# Patient Record
Sex: MALE | Race: BLACK OR AFRICAN AMERICAN | URBAN - METROPOLITAN AREA
[De-identification: names, ages, dates, MRNs, and addresses within clinical notes are randomized per-mention and may not be internally consistent; named-entity substitution may affect disease eponyms.]

---

## 2017-10-17 ENCOUNTER — OFFICE VISIT (OUTPATIENT)
Dept: URGENT CARE | Facility: URGENT CARE | Age: 52
End: 2017-10-17

## 2017-10-17 VITALS
OXYGEN SATURATION: 96 % | WEIGHT: 227 LBS | SYSTOLIC BLOOD PRESSURE: 134 MMHG | DIASTOLIC BLOOD PRESSURE: 83 MMHG | HEART RATE: 73 BPM | TEMPERATURE: 98.3 F

## 2017-10-17 DIAGNOSIS — S86.912A KNEE STRAIN, LEFT, INITIAL ENCOUNTER: Primary | ICD-10-CM

## 2017-10-17 PROCEDURE — 99213 OFFICE O/P EST LOW 20 MIN: CPT | Performed by: FAMILY MEDICINE

## 2017-10-17 ASSESSMENT — PAIN SCALES - GENERAL: PAINLEVEL: SEVERE PAIN (6)

## 2017-10-17 NOTE — MR AVS SNAPSHOT
"              After Visit Summary   10/17/2017    Nadir Marrero    MRN: [id number]           Patient Information     Date Of Birth          1965        Visit Information        Provider Department      10/17/2017 7:15 PM Juan Tristan MD [facility]        Today's Diagnoses     Knee strain, left, initial encounter    -  1       Follow-ups after your visit        Who to contact     If you have questions or need follow up information about today's clinic visit or your schedule please contact [facility] directly at [phone].  Normal or non-critical lab and imaging results will be communicated to you by [employer], letter or phone within 4 business days after the clinic has received the results. If you do not hear from us within 7 days, please contact the clinic through [employer] or phone. If you have a critical or abnormal lab result, we will notify you by phone as soon as possible.  Submit refill requests through [employer] or call your pharmacy and they will forward the refill request to us. Please allow 3 business days for your refill to be completed.          Additional Information About Your Visit        MyChart Information     [employer] lets you send messages to your doctor, view your test results, renew your prescriptions, schedule appointments and more. To sign up, go to [email or web address] . Click on \"Log in\" on the left side of the screen, which will take you to the Welcome page. Then click on \"Sign up Now\" on the right side of the page.     You will be asked to enter the access code listed below, as well as some personal information. Please follow the directions to create your username and password.     Your access code is: [email or web address]  Expires: 1/15/2018  8:04 PM     Your access code will  in 90 days. If you need help or a new code, please call your [facility] or [phone].        Care EveryWhere ID     This is your Care EveryWhere ID. " This could be used by other organizations to access your [facility] medical records  [id number]        Your Vitals Were     Pulse Temperature Pulse Oximetry             73 98.3  F (36.8  C) (Oral) 96%          Blood Pressure from Last 3 Encounters:   10/17/17 134/83    Weight from Last 3 Encounters:   10/17/17 227 lb (103 kg)              Today, you had the following     No orders found for display       Primary Care Provider    None Specified       No primary provider on file.        Equal Access to Services     [facility]: Hadii aad ku hadasho Soomaali, waaxda luqadaha, qaybta kaalmada adeegyada, waxay idiin hayaan adeeg jayashree beavers . So [facility] [phone].    ATENCIÓN: Si habla español, tiene a camacho disposición servicios gratuitos de asistencia lingüística. Llame al [phone].    We comply with applicable federal civil rights laws and [place] laws. We do not discriminate on the basis of race, color, national origin, age, disability, sex, sexual orientation, or gender identity.            Thank you!     Thank you for choosing [facility]  for your care. Our goal is always to provide you with excellent care. Hearing back from our patients is one way we can continue to improve our services. Please take a few minutes to complete the written survey that you may receive in the mail after your visit with us. Thank you!             Your Updated Medication List - Protect others around you: Learn how to safely use, store and throw away your medicines at www.disposemymeds.org.      Notice  As of 10/17/2017  8:04 PM    You have not been prescribed any medications.

## 2017-10-17 NOTE — LETTER
"[facility]  [address]  [facility] [address]  Phone: [phone]    October 17, 2017        Nadir Marrero  [address] [place] [id number]          To whom it may concern:    RE: Nadir Marrero    Patient was seen and treated today at our clinic. 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     He should avoid climbing and risk of falls over the next 1-2 weeks until his pain has improved. I would therefore recommend that he avoid painful driving or bending of the knee over the next week. He may continue to weight bear as tolerated. Follow-up if symptoms persist or worsen.        Sincerely,        Juan Tristan MD  "

## 2017-10-18 NOTE — PROGRESS NOTES
Some of this note was populated by a medical assistant.     SUBJECTIVE:   Nadir Marrero is a 51 year old male who presents to clinic today for the following health issues:    Musculoskeletal problem/pain      Duration: yesterday while at work noted to have a palate on a fork lift strike him in the anterior left knee. He has been walking on the knee since that time although antalgic. Did have a previous left knee many years ago and completely resolved within a week at that time. Denies ongoing knee pain up until yesterday.         Description  Location: left knee    Intensity:  6/10    Accompanying signs and symptoms: warmth and swelling    History  Previous similar problem: YES  Previous evaluation:  none    Precipitating or alleviating factors:  Trauma or overuse: YES  Aggravating factors include: bending    Therapies tried and outcome: nothing and Ibuprofen    Problem list and histories reviewed & adjusted, as indicated.  Additional history: as documented    There is no problem list on file for this patient.    No past surgical history on file.    Social History   Substance Use Topics     Smoking status: Never Smoker     Smokeless tobacco: Never Used     Alcohol use Not on file     No family history on file.      No current outpatient prescriptions on file.     No Known Allergies      Reviewed and updated as needed this visit by clinical staff       Reviewed and updated as needed this visit by Provider         ROS:  Constitutional, HEENT, cardiovascular, pulmonary, gi and gu systems are negative, except as otherwise noted.      OBJECTIVE:   /83 (BP Location: Left arm, Patient Position: Chair, Cuff Size: Adult Large)  Pulse 73  Temp 98.3  F (36.8  C) (Oral)  Wt 227 lb (103 kg)  SpO2 96%  There is no height or weight on file to calculate BMI.  GENERAL: healthy, alert and no distress  NECK: no adenopathy, no asymmetry, masses, or scars and thyroid normal to palpation  RESP: lungs clear to auscultation -  no rales, rhonchi or wheezes  KNEE: without appreciable effusion, swelling or redness.   Noted unwilling to bend the knee on exam, however seems to bend it to 45 degrees when distracted.   Mild patellar area compression pain and medial joint line pain.   Normal peripheral circulation and sensation. Noted antalgic gait however ambulating well.       Patient refused xray or bracing.     ASSESSMENT/PLAN:       ICD-10-CM    1. Knee strain, left, initial encounter S86.912A         Patient refused pain medicine or bracing. Refused xray or crutches.   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   Work note with restrictions provided.   Encouraged follow-up 2-3 weeks if persisting and sooner if worsening for repeat evaluation.   The patient indicates understanding of these issues and agrees with the plan.  Juan Tristan MD      [facility]

## 2017-10-18 NOTE — NURSING NOTE
Chief Complaint   Patient presents with     Knee Pain     left       Initial /83 (BP Location: Left arm, Patient Position: Chair, Cuff Size: Adult Large)  Pulse 73  Temp 98.3  F (36.8  C) (Oral)  Wt 227 lb (103 kg)  SpO2 96% There is no height or weight on file to calculate BMI.  Medication Reconciliation: complete   Sydnee New, CMA